# Patient Record
Sex: MALE | Race: WHITE | URBAN - METROPOLITAN AREA
[De-identification: names, ages, dates, MRNs, and addresses within clinical notes are randomized per-mention and may not be internally consistent; named-entity substitution may affect disease eponyms.]

---

## 2023-05-26 ENCOUNTER — OFFICE VISIT (OUTPATIENT)
Dept: INTERNAL MEDICINE CLINIC | Facility: CLINIC | Age: 28
End: 2023-05-26

## 2023-05-26 VITALS
OXYGEN SATURATION: 97 % | BODY MASS INDEX: 23.95 KG/M2 | HEART RATE: 74 BPM | TEMPERATURE: 99 F | SYSTOLIC BLOOD PRESSURE: 128 MMHG | DIASTOLIC BLOOD PRESSURE: 64 MMHG | HEIGHT: 68 IN | WEIGHT: 158 LBS

## 2023-05-26 DIAGNOSIS — E78.5 HYPERLIPIDEMIA, UNSPECIFIED HYPERLIPIDEMIA TYPE: ICD-10-CM

## 2023-05-26 DIAGNOSIS — H91.90 HEARING LOSS, UNSPECIFIED HEARING LOSS TYPE, UNSPECIFIED LATERALITY: ICD-10-CM

## 2023-05-26 DIAGNOSIS — J45.21 MILD INTERMITTENT ASTHMA WITH ACUTE EXACERBATION: ICD-10-CM

## 2023-05-26 DIAGNOSIS — Z00.00 PHYSICAL EXAM: Primary | ICD-10-CM

## 2023-05-26 DIAGNOSIS — K50.919 CROHN'S DISEASE WITH COMPLICATION, UNSPECIFIED GASTROINTESTINAL TRACT LOCATION (HCC): ICD-10-CM

## 2023-05-26 DIAGNOSIS — L40.9 PSORIASIS: ICD-10-CM

## 2023-05-26 DIAGNOSIS — Z91.09 ENVIRONMENTAL ALLERGIES: ICD-10-CM

## 2023-05-26 RX ORDER — IPRATROPIUM BROMIDE AND ALBUTEROL SULFATE 2.5; .5 MG/3ML; MG/3ML
3 SOLUTION RESPIRATORY (INHALATION) EVERY 4 HOURS PRN
Qty: 100 EACH | Refills: 2 | Status: SHIPPED | OUTPATIENT
Start: 2023-05-26

## 2023-05-26 RX ORDER — FEXOFENADINE HCL 180 MG/1
180 TABLET ORAL DAILY
Qty: 90 TABLET | Refills: 1 | Status: SHIPPED | OUTPATIENT
Start: 2023-05-26

## 2023-05-26 RX ORDER — ALBUTEROL SULFATE 90 UG/1
AEROSOL, METERED RESPIRATORY (INHALATION)
COMMUNITY
Start: 2021-04-20

## 2023-05-26 RX ORDER — BUDESONIDE 1 MG/2ML
1 INHALANT ORAL 2 TIMES DAILY PRN
Qty: 100 EACH | Refills: 1 | Status: SHIPPED | OUTPATIENT
Start: 2023-05-26

## 2023-05-26 RX ORDER — ALBUTEROL SULFATE 2.5 MG/3ML
3 SOLUTION RESPIRATORY (INHALATION) AS DIRECTED
COMMUNITY
Start: 2021-11-29

## 2023-05-26 RX ORDER — ZAFIRLUKAST 20 MG/1
20 TABLET, FILM COATED ORAL 2 TIMES DAILY
Qty: 60 TABLET | Refills: 5 | Status: SHIPPED | OUTPATIENT
Start: 2023-05-26

## 2023-05-26 RX ORDER — ALBUTEROL SULFATE 2.5 MG/3ML
2.5 SOLUTION RESPIRATORY (INHALATION) EVERY 4 HOURS PRN
Refills: 3 | Status: CANCELLED | OUTPATIENT
Start: 2023-05-26

## 2023-05-26 NOTE — PATIENT INSTRUCTIONS
1. Physical exam  Physical exam instruction: Improve diet and exercise, complete fasting labs in the near future and you will be called with results 5-7 days after completed, call with questions. Call the central scheduling number at [phone] to schedule at any of the [facility] locations    - TSH W REFLEX TO FREE T4; Future  - URINALYSIS WITH CULTURE REFLEX  - VITAMIN D; Future  - CBC WITH DIFFERENTIAL WITH PLATELET; Future  - COMP METABOLIC PANEL (14); Future  - LIPID PANEL; Future  - TESTOSTERONE TOTAL& WEAKLY BOUND W/ SHBG; Future    2. Mild intermittent asthma with acute exacerbation  Lets use the nebulizer treatments here, upgrading these to ipratropium albuterol, and using budesonide as we need to as a controller medicine  Think about the allergy doctor downstairs lets make an appoint with him  I will send in the nebulizer so we can get this through the pharmacy as well  - ipratropium-albuterol 0.5-2.5 (3) MG/3ML Inhalation Solution; Take 3 mL by nebulization every 4 (four) hours as needed. Dispense: 100 each; Refill: 2  - Budesonide 1 MG/2ML Inhalation Suspension; Take 2 mL (1 mg total) by nebulization 2 (two) times daily as needed. Dispense: 100 each; Refill: 1  - EXT DME NEBULIZER   - ALLERGY - INTERNAL    3. Crohn's disease with complication, unspecified gastrointestinal tract location [facility])  Stable continue current monitoring management here, but with an unknown diagnosis and surveillance recommendations I like the idea of you seeing the gastroenterology team, they may have a 1 medication recommendation that may help the skin the bowels and the asthma, lets talk to them. - GASTRO - INTERNAL    4. Hyperlipidemia, unspecified hyperlipidemia type  Stable track these lab work, lets get some lab work orders on you in the next 2 weeks and I will reach out to you with results    5.  Environmental allergies  Fexofenadine and Accolate for allergy control, you can go on and off these during the heavy seasons  - fexofenadine 180 MG Oral Tab; Take 1 tablet (180 mg total) by mouth daily. Dispense: 90 tablet; Refill: 1  - zafirlukast 20 MG Oral Tab; Take 1 tablet (20 mg total) by mouth 2 (two) times daily. Dispense: 60 tablet; Refill: 5    6. Hearing loss, unspecified hearing loss type, unspecified laterality  Lets think about the audiology clinic for hearing testing and to optimize your current hearing  - OP REFERRAL TO AUDIOLOGY    7. Psoriasis  Continue with anything topical you have been given, thinking about the dermatology/allergy follow-up as well.

## 2023-05-30 ENCOUNTER — TELEPHONE (OUTPATIENT)
Dept: INTERNAL MEDICINE CLINIC | Facility: CLINIC | Age: 28
End: 2023-05-30

## 2023-05-30 NOTE — TELEPHONE ENCOUNTER
Yuliana Clarke requesting alternative  Ipratropi/Alb 0.5/3 mg Inh SL 30x3 ml    Preferred alternative Ipratropium Solinh    Placed in green folder

## 2023-05-31 RX ORDER — ALBUTEROL SULFATE 2.5 MG/3ML
2.5 SOLUTION RESPIRATORY (INHALATION) EVERY 4 HOURS PRN
Qty: 120 ML | Refills: 2 | Status: SHIPPED | OUTPATIENT
Start: 2023-05-31

## 2023-06-02 NOTE — TELEPHONE ENCOUNTER
Drug Change Requests    [place]    For Ipratropium Inhal Soln 25 X 2.5ml    Drug not covered by pt's plan  Preferred alternative is IpratropiumSolalbuter     Please call or fax pharmacy to change     Fax placed in green folder

## 2023-06-16 ENCOUNTER — PATIENT MESSAGE (OUTPATIENT)
Dept: INTERNAL MEDICINE CLINIC | Facility: CLINIC | Age: 28
End: 2023-06-16

## 2023-06-16 NOTE — TELEPHONE ENCOUNTER
From: Daphne Vale  To: Patsy Childers DO  Sent: 6/16/2023 6:12 AM CDT  Subject: Refill    Can you please call in a prescription for my rescue inhaler, it never got filled from when I had my visit.

## 2023-06-19 ENCOUNTER — PATIENT MESSAGE (OUTPATIENT)
Dept: INTERNAL MEDICINE CLINIC | Facility: CLINIC | Age: 28
End: 2023-06-19

## 2023-06-19 NOTE — TELEPHONE ENCOUNTER
Patricia Traore, would you be able to assist ? Not sure if there is another inhaler that he has been using ? That isn't the albuterol powder inhaler ?

## 2023-06-20 ENCOUNTER — TELEPHONE (OUTPATIENT)
Dept: INTERNAL MEDICINE CLINIC | Facility: CLINIC | Age: 28
End: 2023-06-20

## 2023-06-20 RX ORDER — ALBUTEROL SULFATE 90 UG/1
2 AEROSOL, METERED RESPIRATORY (INHALATION) EVERY 4 HOURS PRN
Qty: 1 EACH | Refills: 3 | Status: SHIPPED | OUTPATIENT
Start: 2023-06-20

## 2023-06-20 NOTE — TELEPHONE ENCOUNTER
To Yaneli Dubon please advise. Already sent to Dr. Jimmie Rossi awaiting response.  I think he just ordered the wrong medication

## 2023-06-20 NOTE — TELEPHONE ENCOUNTER
Fax received from [employer]     Re: Benedicto Davis Oral PWD INH ([phone]    Did you mean to send for the proair respiclick powder inhaler?  It is not covered and patient is usually on the regular proair     Fax placed in green folder

## 2023-07-13 ENCOUNTER — PATIENT MESSAGE (OUTPATIENT)
Dept: INTERNAL MEDICINE CLINIC | Facility: CLINIC | Age: 28
End: 2023-07-13

## 2023-07-13 RX ORDER — ALBUTEROL SULFATE 90 UG/1
2 AEROSOL, METERED RESPIRATORY (INHALATION) EVERY 4 HOURS PRN
Qty: 4 EACH | Refills: 3 | Status: SHIPPED | OUTPATIENT
Start: 2023-07-13

## 2023-07-13 NOTE — TELEPHONE ENCOUNTER
From: Jammie Ambriz  To: John Velez DO  Sent: 7/13/2023 11:46 AM CDT  Subject: Refills     Can you please refill my prescriptions. I would really like as many rescue inhalers as possible, I actually lost my job and will be needing to get new insurance when it cancels and would like to refill before then. Thank you.

## 2023-07-13 NOTE — TELEPHONE ENCOUNTER
Refill request is for a maintenance medication and has met the criteria specified in the Ambulatory Medication Refill Standing Order for eligibility, visits, laboratory, alerts and was sent to the requested pharmacy. Requested Prescriptions     Signed Prescriptions Disp Refills    albuterol 108 (90 Base) MCG/ACT Inhalation Aero Soln 4 each 3     Sig: Inhale 2 puffs into the lungs every 4 (four) hours as needed for Wheezing.      Authorizing Provider: Nicolasa Baumgarten     Ordering User: Cass Hernandez

## 2024-01-24 PROBLEM — F31.78 BIPOLAR 1 DISORDER, MIXED, FULL REMISSION (CMD): Status: ACTIVE | Noted: 2024-01-24

## 2024-01-24 PROBLEM — K50.919 CROHN'S DISEASE WITH COMPLICATION (CMD): Status: ACTIVE | Noted: 2023-05-26

## 2024-01-24 PROBLEM — J45.20 MILD INTERMITTENT ASTHMA: Status: ACTIVE | Noted: 2021-09-16

## 2024-01-24 PROBLEM — Z91.09 ENVIRONMENTAL ALLERGIES: Status: ACTIVE | Noted: 2023-05-26

## 2024-01-24 PROBLEM — E78.5 HYPERLIPIDEMIA: Status: ACTIVE | Noted: 2023-05-26

## 2024-01-30 ENCOUNTER — TELEPHONE (OUTPATIENT)
Dept: CARDIOLOGY | Age: 29
End: 2024-01-30

## 2024-02-02 ENCOUNTER — HOSPITAL ENCOUNTER (OUTPATIENT)
Dept: CARDIOLOGY | Age: 29
End: 2024-02-02
Attending: FAMILY MEDICINE

## 2024-02-02 DIAGNOSIS — R01.1 HEART MURMUR, SYSTOLIC: ICD-10-CM

## 2024-02-02 LAB
AORTIC VALVE AREA (AVA): 0.58
ASCENDING AORTA (AAD): 3
AV PEAK GRADIENT (AVPG): 7
AV PEAK VELOCITY (AVPV): 1.29
AV STENOSIS SEVERITY TEXT: NORMAL
AVI LVOT PEAK GRADIENT (LVOTMG): 0.7
E WAVE DECELARATION TIME (MDT): 4.52
INTERVENTRICULAR SEPTUM IN END DIASTOLE (IVSD): 1.74
LEFT INTERNAL DIMENSION IN SYSTOLE (LVSD): 0.6
LEFT VENTRICULAR INTERNAL DIMENSION IN DIASTOLE (LVDD): 3.4
LEFT VENTRICULAR POSTERIOR WALL IN END DIASTOLE (LVPW): 4.8
LV EF: NORMAL %
LVOT 2D (LVOTD): 18.3
LVOT VTI (LVOTVTI): 1.07
MV E TISSUE VEL LAT (MELV): 1.23
MV E TISSUE VEL MED (MESV): 13.1
MV E WAVE VEL/E TISSUE VEL MED(MSR): 12.9
MV PEAK A VELOCITY (MVPAV): 233
MV PEAK E VELOCITY (MVPEV): 0.33
RV END SYSTOLIC LONGITUDINAL STRAIN FREE WALL (RVGS): 2.1
TRICUSPID VALVE PEAK REGURGITATION VELOCITY (TRPV): 2.58

## 2024-02-02 PROCEDURE — 93306 TTE W/DOPPLER COMPLETE: CPT

## 2024-02-02 PROCEDURE — 76376 3D RENDER W/INTRP POSTPROCES: CPT | Performed by: INTERNAL MEDICINE

## 2024-02-02 PROCEDURE — 93306 TTE W/DOPPLER COMPLETE: CPT | Performed by: INTERNAL MEDICINE

## 2024-02-02 PROCEDURE — 76376 3D RENDER W/INTRP POSTPROCES: CPT

## 2024-02-02 PROCEDURE — 93356 MYOCRD STRAIN IMG SPCKL TRCK: CPT | Performed by: INTERNAL MEDICINE

## 2024-10-07 RX ORDER — ALBUTEROL SULFATE 90 UG/1
2 INHALANT RESPIRATORY (INHALATION) EVERY 4 HOURS PRN
Qty: 1 EACH | Refills: 0 | Status: SHIPPED | OUTPATIENT
Start: 2024-10-07

## 2024-10-07 NOTE — TELEPHONE ENCOUNTER
Pt needs to be seen. Mychart sent     Refill request is for a maintenance medication and has met the criteria specified in the Ambulatory Medication Refill Standing Order for eligibility, visits, laboratory, alerts and was sent to the requested pharmacy.    Requested Prescriptions     Signed Prescriptions Disp Refills    albuterol 108 (90 Base) MCG/ACT Inhalation Aero Soln 1 each 0     Sig: INHALE 2 PUFFS BY MOUTH EVERY 4 HOURS AS NEEDED FOR WHEEZING     Authorizing Provider: D'AMICO, JEFF ANTHONY     Ordering User: CONCEPCIÓN OROURKE

## 2024-11-06 RX ORDER — ALBUTEROL SULFATE 90 UG/1
2 INHALANT RESPIRATORY (INHALATION) EVERY 4 HOURS PRN
Qty: 8.5 EACH | Refills: 0 | OUTPATIENT
Start: 2024-11-06

## 2024-11-06 NOTE — TELEPHONE ENCOUNTER
Patient is due for labs and physical. Did not read [email or web address] message last month.     To FD-- please call and schedule patient for physical and then route back to Rx.

## 2024-11-06 NOTE — TELEPHONE ENCOUNTER
Patient called back and states he moved out of town and will no longer be able to be a patient of Dr D'Amico's.    Please disregard Albuterol prescription.

## 2024-11-06 NOTE — TELEPHONE ENCOUNTER
Noted. Rx denied, message sent to pharmacy.    Requested Prescriptions     Refused Prescriptions Disp Refills    ALBUTEROL 108 (90 Base) MCG/ACT Inhalation Aero Soln [Pharmacy Med Name: ALBUTEROL HFA (PROAIR) INHALER] 8.5 each 0     Sig: TAKE 2 PUFFS BY MOUTH EVERY 4 HOURS AS NEEDED FOR WHEEZE     Refused By: DANIEL HURTADO     Reason for Refusal: Patient no longer under prescriber care